# Patient Record
Sex: MALE | Race: WHITE
[De-identification: names, ages, dates, MRNs, and addresses within clinical notes are randomized per-mention and may not be internally consistent; named-entity substitution may affect disease eponyms.]

---

## 2018-02-22 ENCOUNTER — HOSPITAL ENCOUNTER (OUTPATIENT)
Dept: HOSPITAL 45 - C.RDSM | Age: 23
Discharge: HOME | End: 2018-02-22
Attending: FAMILY MEDICINE
Payer: COMMERCIAL

## 2018-02-22 DIAGNOSIS — M25.511: Primary | ICD-10-CM

## 2018-02-22 NOTE — DIAGNOSTIC IMAGING REPORT
R SHOULDER MIN 2 VIEWS



CLINICAL HISTORY: Right shoulder pain     



COMPARISON: None.



DISCUSSION: No fractures or dislocations are visualized. There are no visible

periarticular calcifications.    



IMPRESSION: Unremarkable conventional radiographic evaluation of the right

shoulder







Electronically signed by:  Shemar Shields M.D.

2/22/2018 9:39 AM



Dictated Date/Time:  2/22/2018 9:39 AM

## 2018-03-16 ENCOUNTER — HOSPITAL ENCOUNTER (OUTPATIENT)
Dept: HOSPITAL 45 - C.RAD | Age: 23
Discharge: HOME | End: 2018-03-16
Attending: FAMILY MEDICINE
Payer: COMMERCIAL

## 2018-03-16 DIAGNOSIS — R53.1: Primary | ICD-10-CM

## 2018-03-16 NOTE — DIAGNOSTIC IMAGING REPORT
C-SPINE ROUTINE W/FLEX EXT



HISTORY:  22 years-old Male R ARM WEAKNESS acute right arm and right shoulder

weakness status post trauma



COMPARISON: None available



TECHNIQUE: 5 standard views of the cervical spine with additional lateral

flexion and extension views for a total of 7 images



FINDINGS: 

There is straightening of the normal cervical lordosis. No acute fracture or

subluxation. Bony neuroforamina appear patent. Alignment is preserved on the

neutral, flexion and extension views. No prevertebral soft tissue swelling. No

opaque foreign body. Imaged lung apices are clear.



IMPRESSION:

1. No acute fracture or subluxation.

2. Straightening of the normal cervical lordosis.



The above report was generated using voice recognition software. It may contain

grammatical, syntax or spelling errors.







Electronically signed by:  Stuart Harris M.D.

3/16/2018 6:02 PM



Dictated Date/Time:  3/16/2018 6:00 PM

## 2018-04-04 ENCOUNTER — HOSPITAL ENCOUNTER (OUTPATIENT)
Dept: HOSPITAL 45 - C.MRIBC | Age: 23
Discharge: HOME | End: 2018-04-04
Attending: FAMILY MEDICINE
Payer: COMMERCIAL

## 2018-04-04 DIAGNOSIS — R29.898: ICD-10-CM
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
## 2018-04-04 DIAGNOSIS — S49.90XA: Primary | ICD-10-CM

## 2018-04-04 NOTE — DIAGNOSTIC IMAGING REPORT
R INJECTION SHOULDER PRE MRI



CLINICAL HISTORY: 22 years-old Male presenting with RT SHOULDER WEAKNESS,

SHOULDER INJURY.



COMPARISON: Plain radiograph from 2/22/2018.



PROCEDURE: 

The risks, benefits, and alternatives to the procedure were discussed with the

patient. Written informed consent was obtained. The patient was placed supine on

the fluoroscopy table, and a right shoulder injection was performed under

fluoroscopic guidance. 



The area was prepped and draped in the usual sterile fashion. The skin and soft

tissues anesthetized with local 1% lidocaine. The right shoulder joint was

accessed utilizing a 22-gauge needle, and approximately 12 cc of a mixture of

gadolinium contrast, Optiray 300, and saline was injected into the joint space

under fluoroscopic guidance. There was normal distention of the capsule. 



The procedure was well tolerated without immediate complication. The patient was

then transferred to MRI for MR arthrography.



Fluoroscopy dosage (mGy): Not available.



Fluoroscopy time: 9 seconds.



Number of fluoroscopic spot images: 0.



IMPRESSION: 

Successful injection of the right shoulder under fluoroscopic guidance.







Electronically signed by:  Jayme Robins M.D.

4/4/2018 1:58 PM



Dictated Date/Time:  4/4/2018 1:54 PM

## 2018-04-04 NOTE — DIAGNOSTIC IMAGING REPORT
POST ARTHROGRAM MRI THE RIGHT SHOULDER



CLINICAL HISTORY: RT SHOULDER WEAKNESS, SHOULDER INJURY    



COMPARISON STUDY:  Conventional radiographic study dated February 22, 2018



FINDINGS: Imaging was performed in the axial, sagittal, and coronal planes.



There are no areas of marrow replacement to indicate occult fracture or bone

bruise.



The bicipital tendon appears normal.



There is no evidence of rotator cuff tear.



There is a tear the anterior glenoid labrum with a small focus of periosteal

sleeve avulsion.



IMPRESSION:  Anterior labral tear with a small focus of periosteal sleeve

avulsion. 









Electronically signed by:  Shemar Shields M.D.

4/4/2018 2:29 PM



Dictated Date/Time:  4/4/2018 2:23 PM